# Patient Record
Sex: MALE | Employment: PART TIME | URBAN - METROPOLITAN AREA
[De-identification: names, ages, dates, MRNs, and addresses within clinical notes are randomized per-mention and may not be internally consistent; named-entity substitution may affect disease eponyms.]

---

## 2018-05-23 ENCOUNTER — HOSPITAL ENCOUNTER (OUTPATIENT)
Dept: NEUROLOGY | Age: 23
Discharge: HOME | End: 2018-05-23
Attending: FAMILY MEDICINE | Admitting: FAMILY MEDICINE

## 2018-05-23 DIAGNOSIS — R00.2 PALPITATIONS: ICD-10-CM

## 2018-05-24 LAB
ACQUISITION DURATION: NORMAL S
AVERAGE HEART RATE: 77 BPM
EKG DIAGNOSIS: NORMAL
HOLTER MAX HEART RATE: 125 BPM
HOOKUP DATE: NORMAL
HOOKUP TIME: NORMAL
Lab: NORMAL
MAX HEART RATE TIME/DATE: NORMAL
MIN HEART RATE TIME/DATE: NORMAL
MIN HEART RATE: 51 BPM
NUMBER OF QRS COMPLEXES: NORMAL
NUMBER OF SUPRAVENTRICULAR BEATS IN RUNS: 0
NUMBER OF SUPRAVENTRICULAR COUPLETS: 0
NUMBER OF SUPRAVENTRICULAR ECTOPICS: 5
NUMBER OF SUPRAVENTRICULAR ISOLATED BEATS: 5
NUMBER OF SUPRAVENTRICULAR RUNS: 0
NUMBER OF VENTRICULAR BEATS IN RUNS: 0
NUMBER OF VENTRICULAR BIGEMINAL CYCLES: 0
NUMBER OF VENTRICULAR COUPLETS: 0
NUMBER OF VENTRICULAR ECTOPICS: 2
NUMBER OF VENTRICULAR ISOLATED BEATS: 2
NUMBER OF VENTRICULAR RUNS: 0

## 2020-12-26 ENCOUNTER — HOSPITAL ENCOUNTER (EMERGENCY)
Age: 25
Discharge: PSYCHIATRIC OR SUBSTANCE USE FACILITY | End: 2020-12-27
Attending: EMERGENCY MEDICINE
Payer: COMMERCIAL

## 2020-12-26 ENCOUNTER — APPOINTMENT (OUTPATIENT)
Dept: GENERAL RADIOLOGY | Age: 25
End: 2020-12-26
Payer: COMMERCIAL

## 2020-12-26 LAB
A/G RATIO: 1.2 (ref 1.1–2.2)
ACETAMINOPHEN LEVEL: <5 UG/ML (ref 10–30)
ALBUMIN SERPL-MCNC: 5.2 G/DL (ref 3.4–5)
ALP BLD-CCNC: 106 U/L (ref 40–129)
ALT SERPL-CCNC: 17 U/L (ref 10–40)
AMPHETAMINE SCREEN, URINE: POSITIVE
ANION GAP SERPL CALCULATED.3IONS-SCNC: 14 MMOL/L (ref 3–16)
AST SERPL-CCNC: 24 U/L (ref 15–37)
BARBITURATE SCREEN URINE: ABNORMAL
BENZODIAZEPINE SCREEN, URINE: ABNORMAL
BILIRUB SERPL-MCNC: 0.9 MG/DL (ref 0–1)
BILIRUBIN DIRECT: <0.2 MG/DL (ref 0–0.3)
BILIRUBIN URINE: NEGATIVE
BILIRUBIN, INDIRECT: NORMAL MG/DL (ref 0–1)
BLOOD, URINE: NEGATIVE
BUN BLDV-MCNC: 8 MG/DL (ref 7–20)
CALCIUM SERPL-MCNC: 10.2 MG/DL (ref 8.3–10.6)
CANNABINOID SCREEN URINE: POSITIVE
CHLORIDE BLD-SCNC: 100 MMOL/L (ref 99–110)
CLARITY: CLEAR
CO2: 24 MMOL/L (ref 21–32)
COCAINE METABOLITE SCREEN URINE: ABNORMAL
COLOR: YELLOW
CREAT SERPL-MCNC: 0.8 MG/DL (ref 0.9–1.3)
ETHANOL: NORMAL MG/DL (ref 0–0.08)
GFR AFRICAN AMERICAN: >60
GFR NON-AFRICAN AMERICAN: >60
GLOBULIN: 4.2 G/DL
GLUCOSE BLD-MCNC: 97 MG/DL (ref 70–99)
GLUCOSE URINE: NEGATIVE MG/DL
INR BLD: 1.08 (ref 0.86–1.14)
KETONES, URINE: 40 MG/DL
LEUKOCYTE ESTERASE, URINE: NEGATIVE
LIPASE: 17 U/L (ref 13–60)
Lab: ABNORMAL
METHADONE SCREEN, URINE: ABNORMAL
MICROSCOPIC EXAMINATION: ABNORMAL
NITRITE, URINE: NEGATIVE
OPIATE SCREEN URINE: ABNORMAL
OXYCODONE URINE: ABNORMAL
PH UA: 7
PH UA: 7 (ref 5–8)
PHENCYCLIDINE SCREEN URINE: ABNORMAL
POTASSIUM REFLEX MAGNESIUM: 4.3 MMOL/L (ref 3.5–5.1)
PROPOXYPHENE SCREEN: ABNORMAL
PROTEIN UA: NEGATIVE MG/DL
PROTHROMBIN TIME: 12.5 SEC (ref 10–13.2)
SALICYLATE, SERUM: <0.3 MG/DL (ref 15–30)
SARS-COV-2, NAAT: NOT DETECTED
SODIUM BLD-SCNC: 138 MMOL/L (ref 136–145)
SPECIFIC GRAVITY UA: 1.02 (ref 1–1.03)
TOTAL PROTEIN: 9.4 G/DL (ref 6.4–8.2)
URINE REFLEX TO CULTURE: ABNORMAL
URINE TYPE: ABNORMAL
UROBILINOGEN, URINE: 0.2 E.U./DL

## 2020-12-26 PROCEDURE — 85025 COMPLETE CBC W/AUTO DIFF WBC: CPT

## 2020-12-26 PROCEDURE — 83690 ASSAY OF LIPASE: CPT

## 2020-12-26 PROCEDURE — G0480 DRUG TEST DEF 1-7 CLASSES: HCPCS

## 2020-12-26 PROCEDURE — U0002 COVID-19 LAB TEST NON-CDC: HCPCS

## 2020-12-26 PROCEDURE — 80307 DRUG TEST PRSMV CHEM ANLYZR: CPT

## 2020-12-26 PROCEDURE — 36415 COLL VENOUS BLD VENIPUNCTURE: CPT

## 2020-12-26 PROCEDURE — 71045 X-RAY EXAM CHEST 1 VIEW: CPT

## 2020-12-26 PROCEDURE — 99284 EMERGENCY DEPT VISIT MOD MDM: CPT

## 2020-12-26 PROCEDURE — 81003 URINALYSIS AUTO W/O SCOPE: CPT

## 2020-12-26 PROCEDURE — 80053 COMPREHEN METABOLIC PANEL: CPT

## 2020-12-26 PROCEDURE — 93005 ELECTROCARDIOGRAM TRACING: CPT | Performed by: PHYSICIAN ASSISTANT

## 2020-12-26 PROCEDURE — 85610 PROTHROMBIN TIME: CPT

## 2020-12-26 RX ORDER — FLUOXETINE 10 MG/1
10 CAPSULE ORAL DAILY
Status: ON HOLD | COMMUNITY
End: 2020-12-27

## 2020-12-26 RX ORDER — ZOLPIDEM TARTRATE 10 MG/1
TABLET ORAL NIGHTLY PRN
Status: ON HOLD | COMMUNITY
End: 2020-12-28 | Stop reason: HOSPADM

## 2020-12-26 ASSESSMENT — ENCOUNTER SYMPTOMS
ABDOMINAL PAIN: 0
CHEST TIGHTNESS: 0
NAUSEA: 0
VOMITING: 0
DIARRHEA: 0
SHORTNESS OF BREATH: 0

## 2020-12-26 NOTE — ED NOTES
Bed: 02  Expected date:   Expected time:   Means of arrival:   Comments:  Triage/emergency     Chapo Borja RN  12/26/20 9052

## 2020-12-26 NOTE — ED PROVIDER NOTES
He denies homicidal intent. He denies hallucinations. He denies that he is experiencing any physical pain including that of chest pain palpitations lightheadedness or shortness of breath. Has no GI or  complaints. It is with this he presents to the ED self-directed by his psychiatrist for evaluation and treatment. Nursing Notes were all reviewed and agreed with or any disagreements were addressed in the HPI. REVIEW OF SYSTEMS    (2-9 systems for level 4, 10 or more for level 5)     Review of Systems   Constitutional: Negative for activity change, chills and fever. Respiratory: Negative for chest tightness and shortness of breath. Cardiovascular: Negative for chest pain. Gastrointestinal: Negative for abdominal pain, diarrhea, nausea and vomiting. Genitourinary: Negative for dysuria and flank pain. Skin: Negative for rash and wound. Psychiatric/Behavioral: Positive for decreased concentration, dysphoric mood, self-injury and suicidal ideas. Positives and Pertinent negatives as per HPI. Except as noted above in the ROS, all other systems were reviewed and negative. PAST MEDICAL HISTORY   History reviewed. No pertinent past medical history. SURGICAL HISTORY   History reviewed. No pertinent surgical history. CURRENTMEDICATIONS       Previous Medications    FLUOXETINE (PROZAC) 10 MG CAPSULE    Take 10 mg by mouth daily    ZOLPIDEM (AMBIEN) 10 MG TABLET    Take by mouth nightly as needed for Sleep. ALLERGIES     Lamictal [lamotrigine]    FAMILYHISTORY     History reviewed. No pertinent family history.        SOCIAL HISTORY       Social History     Tobacco Use    Smoking status: Never Smoker    Smokeless tobacco: Never Used   Substance Use Topics    Alcohol use: Not Currently    Drug use: Never       SCREENINGS             PHYSICAL EXAM    (up to 7 for level 4, 8 or more for level 5)     ED Triage Vitals [12/26/20 1819]   BP Temp Temp src Pulse Resp SpO2 Height normal. He does not perceive auditory or visual hallucinations. Mood and Affect: Mood is depressed. Affect is flat. Speech: Speech normal.         Behavior: Behavior normal. Behavior is cooperative. Thought Content: Thought content is not paranoid or delusional. Thought content includes suicidal ideation. Thought content does not include homicidal ideation. Thought content includes suicidal plan. Thought content does not include homicidal plan. Cognition and Memory: He exhibits impaired recent memory. Judgment: Judgment is impulsive. Comments: Well-kept. Good eye contact. Flat affect. Does not appear remorseful. Reports intent for self-harm.          DIAGNOSTIC RESULTS   LABS:    Labs Reviewed   CBC WITH AUTO DIFFERENTIAL - Abnormal; Notable for the following components:       Result Value    RBC 7.51 (*)     MCH 21.0 (*)     RDW 16.4 (*)     All other components within normal limits    Narrative:     Performed at:  [facility] [address], [address]   Phone [phone]   COMPREHENSIVE METABOLIC PANEL W/ REFLEX TO MG FOR LOW K - Abnormal; Notable for the following components:    CREATININE 0.8 (*)     Total Protein 9.4 (*)     Alb 5.2 (*)     All other components within normal limits    Narrative:     Performed at:  [facility]  [address], [address]   Phone [phone]   URINE RT REFLEX TO CULTURE - Abnormal; Notable for the following components:    Ketones, Urine 40 (*)     All other components within normal limits    Narrative:     Performed at:  [facility]  [address], [address]   Phone [phone]   ACETAMINOPHEN LEVEL - Abnormal; Notable for the following components:    Acetaminophen Level <5 (*)     All other components within normal limits    Narrative:     Performed at:  [facility]  [address]  [place], [address]   Phone [phone]   SALICYLATE LEVEL - Abnormal; Notable for the following components:    Salicylate, Serum <7.5 (*)     All other components within normal limits    Narrative:     Performed at:  [facility]  [address] [employer]  [place], [address]   Phone [phone]   HEPATIC FUNCTION PANEL    Narrative:     Performed at:  [facility] [address] [employer], [address]   Phone [phone]   LIPASE    Narrative:     Performed at:  [facility] [address], [address]   Phone [phone]   PROTIME-INR    Narrative:     Performed at:  [facility] [address], [address]   Phone [phone]   ETHANOL    Narrative:     Performed at:  [facility] [address] [employer], [address]   Phone [phone]   URINE DRUG SCREEN    Narrative:     Performed at:  [facility] [address] [employer], [address]   Phone [phone]       All other labs were within normal range or not returned as of this dictation. EKG: All EKG's are interpreted by the Emergency Department Physician in the absence of a cardiologist.  Please see their note for interpretation of EKG.       RADIOLOGY:   Non-plain film images such as CT, Ultrasound and MRI are read by the radiologist. Plain radiographic images are visualized and preliminarily interpreted by the ED Provider with the below findings:      Interpretation per the Radiologist below, if available at the time of this note:    XR CHEST PORTABLE    (Results Pending)         PROCEDURES   Unless otherwise noted below, none     Procedures    CRITICAL CARE TIME   N/A    CONSULTS:  None      EMERGENCY DEPARTMENT COURSE and DIFFERENTIAL DIAGNOSIS/MDM:   Vitals:    Vitals:    12/26/20 1838 12/26/20 1843 12/26/20 1900 12/26/20 1908   BP: (!) 132/92  139/82 (!) 139/52   Pulse: 107 96 93 95   Resp: 18  16 13   Temp:       SpO2: 100%  100% 100%   Weight:       Height:           Patient was given the following medications:  Medications - No data to display        The patient's detailed history of present illness is documented as above. Upon arrival to the emergency department the patient's vital signs are as documented. The patient is noted to be hemodynamically stable and afebrile. Physical examination findings are as above. Obtained. Laboratory testing and work-up was initiated. I did speak directly with Brielle Escudero at [address] [place] [address],[address] [place] [address] in regards the above-mentioned. She states that the patient from Ambien and alcohol would have a maximum capacity of likely 4 to 6 hours and therefore the patient has no outside that timeframe since he took his medication at 4 AM last evening. Baseline laboratory testing and medical clearance has been recommended. 72-hour hold has been placed on the patient. Initial EKG demonstrates a sinus rhythm with a rate of 91. No evidence of acute ST elevation. Incomplete right bundle branch block noted. Please see attending physician details for further EKG interpretation in comparison. CBC demonstrates no evidence of leukocytosis anemia and/or thrombocytopenia. BUN is a creatinine is 0.8 nonfasting glucose 97 electrolytes and LFTs without significant aberration. Biliruben is normal.  Lipase is 17.  Ethanol is negative. Salicylate Tylenol levels are within normal limits. Urinalysis is pending at the time of this dictation. Rapid Covid is currently in process. Despite the urinalysis results even if it is positive on his toxicology screening he will require ongoing care management on an inpatient basis. As it is the end of my shift, my attending physician will follow up on the outstanding test results and assume the final disposition of this patient.   Please see attending physician note for further medical decision making, consultations, and final disposition of this patient. FINAL IMPRESSION      1. Drug overdose, intentional self-harm, initial encounter ([facility] [address]    2.  Depression with suicidal ideation          DISPOSITION/PLAN   DISPOSITION Decision To Transfer 12/26/2020 07:34:19 PM         (Please note that portions of this note were completed with a voice recognition program.  Efforts were made to edit the dictations but occasionally words are mis-transcribed.)    Tiffany Abel PA-C (electronically signed)           Daphne Ordaz PA-C  12/26/20 1939

## 2020-12-26 NOTE — ED PROVIDER NOTES
I independently performed a history and physical on Calleen Deon. All diagnostic, treatment, and disposition decisions were made by myself in conjunction with the advanced practice provider. I have participated in the medical decision making and directed the treatment plan and disposition of the patient. For further details of [facility] emergency department encounter, please see the advanced practice provider's documentation. CHIEF COMPLAINT  Chief Complaint   Patient presents with    Suicide Attempt     Pt presents to the ED with attempt suicide, Pt believes he took about 21 PILLS of Ambien around 4AM . Pt attempted to take life in 2019. Pt see therpist regularly, Pt states he felt the impulse to harm self because he was feeling depressed. Pt states that he took them with alcohol but not sure what happened after this     Briefly, Sonia Chavarria is a 23 y.o. male  who presents to the ED complaining of having trouble sleeping last night, took his normal Ambien initially but couldn't fall asleep. He says he then impulsively in an attempt to kill himself took 20x tablets 10mg tablets of the Ambien at approximately 0300. No prior suicidal attempts. He says he then blacked out or fell asleep afterwards, unsure if he vomited or not. He called his psychiatrist who is the prescriber who advised he come the ED. He says he is not actively suicidal anymore but is vague and unclear what triggered him to do this last night. He had only one or two alcohol drinks last night. He does not feel fatigued currently. Denies drug use. Regular medicines also include olanzapine and prozac. This is due to bipolar.     FOCUSED PHYSICAL EXAMINATION  BP (!) 139/52   Pulse 95   Temp 98.6 °F (37 °C)   Resp 13   Ht 5' 11\" (1.803 m)   Wt 173 lb (78.5 kg)   SpO2 100%   BMI 24.13 kg/m²    Focused physical examination notable for no acute distress, well-appearing, well-nourished, normal speech and mentation without obvious facial droop, no obvious rash. No obvious cranial nerve deficits on my initial exam. Calm, cooperative, no active SI, RRR CTAB, abd soft NTND, not hallucinating. Fair insight, poor judgment. The 12 lead EKG was interpreted by me as follows:  Rate: normal with a rate of 91  Rhythm: sinus with sinus arrhythmia  Axis: normal  Intervals: normal IL, narrow QRS, normal QTc, incomplete RBBB pattern  ST segments: no ST elevations or depressions  T waves: no abnormal inversions  Non-specific T wave changes: not present  Prior EKG comparison: No prior is currently available for comparison    MDM:  ED course was notable for intentional medication overdose. Although he is not actively suicidal I am still concerned given his impulsive behavior with bipolar history that he is high risk for further self-harm without inpatient psychiatric assessment. He is medically cleared as his work-up in the ED is benign and poison control was consulted and also stated that the patient was medically cleared. He is not exhibit lethargy which would be the primary effect of an Ambien overdose. Drug screen does demonstrate positive amphetamines but he does not exhibit this toxidrome and this may be a false positive. It does show positive cannabis which she did not initially. Rapid Covid negative. CXR clear. Filled out an involuntary psychiatric hold form on the patient and informed him of this, he has remained calm and cooperative, plan to transfer for psychiatric treatment and evaluation. Patient is awaiting an accepting facility at the end of my shift and will be signed out to overnight physician Dr. Molinda Nissen for ongoing monitoring until facility is found to accept. CLINICAL IMPRESSION  1. Drug overdose, intentional self-harm, initial encounter ([facility] [address]    2. Depression with suicidal ideation        DISPOSITION  Vera Fossa was transferred in fair condition.     This chart was created using Dragon dictation software. Efforts were made by me to ensure accuracy, however some errors may be present due to limitations of this technology.             Paul Roger MD  12/26/20 2005       Paul Roger MD  12/26/20 7249

## 2020-12-26 NOTE — ED NOTES
Pt resting quietly, no s/s of distress noted. Suicide precaution in place.  at bedside due to suicidal ideation. Pt in gown. All belongings to be collected by security.       Christi Mejia RN  32/15/32 5958

## 2020-12-27 ENCOUNTER — HOSPITAL ENCOUNTER (INPATIENT)
Age: 25
LOS: 1 days | Discharge: HOME | DRG: 885 | End: 2020-12-28
Attending: PSYCHIATRY & NEUROLOGY | Admitting: PSYCHIATRY & NEUROLOGY
Payer: COMMERCIAL

## 2020-12-27 VITALS
DIASTOLIC BLOOD PRESSURE: 68 MMHG | OXYGEN SATURATION: 98 % | HEIGHT: 71 IN | RESPIRATION RATE: 15 BRPM | WEIGHT: 173 LBS | BODY MASS INDEX: 24.22 KG/M2 | HEART RATE: 60 BPM | TEMPERATURE: 98.6 F | SYSTOLIC BLOOD PRESSURE: 112 MMHG

## 2020-12-27 PROBLEM — F32.9 MAJOR DEPRESSION, SINGLE EPISODE: Status: ACTIVE | Noted: 2020-12-27

## 2020-12-27 PROBLEM — F31.32 BIPOLAR AFFECTIVE DISORDER, DEPRESSED, MODERATE DEGREE (HCC): Status: ACTIVE | Noted: 2020-12-27

## 2020-12-27 LAB
BASOPHILS ABSOLUTE: 0.1 K/UL (ref 0–0.2)
BASOPHILS RELATIVE PERCENT: 0.7 %
EKG ATRIAL RATE: 91 BPM
EKG DIAGNOSIS: NORMAL
EKG P AXIS: 67 DEGREES
EKG P-R INTERVAL: 146 MS
EKG Q-T INTERVAL: 368 MS
EKG QRS DURATION: 102 MS
EKG QTC CALCULATION (BAZETT): 452 MS
EKG R AXIS: 64 DEGREES
EKG T AXIS: 37 DEGREES
EKG VENTRICULAR RATE: 91 BPM
EOSINOPHILS ABSOLUTE: 0.2 K/UL (ref 0–0.6)
EOSINOPHILS RELATIVE PERCENT: 1.6 %
HCT VFR BLD CALC: 49.8 % (ref 40.5–52.5)
HEMATOLOGY PATH CONSULT: YES
HEMOGLOBIN: 15.7 G/DL (ref 13.5–17.5)
LYMPHOCYTES ABSOLUTE: 1.9 K/UL (ref 1–5.1)
LYMPHOCYTES RELATIVE PERCENT: 18 %
MCH RBC QN AUTO: 21 PG (ref 26–34)
MCHC RBC AUTO-ENTMCNC: 31.6 G/DL (ref 31–36)
MCV RBC AUTO: 66.4 FL (ref 80–100)
MICROCYTES: ABNORMAL
MONOCYTES ABSOLUTE: 0.9 K/UL (ref 0–1.3)
MONOCYTES RELATIVE PERCENT: 8.3 %
NEUTROPHILS ABSOLUTE: 7.6 K/UL (ref 1.7–7.7)
NEUTROPHILS RELATIVE PERCENT: 71.4 %
OVALOCYTES: ABNORMAL
PDW BLD-RTO: 16.4 % (ref 12.4–15.4)
PLATELET # BLD: 293 K/UL (ref 135–450)
PLATELET SLIDE REVIEW: ADEQUATE
PMV BLD AUTO: 9 FL (ref 5–10.5)
RBC # BLD: 7.51 M/UL (ref 4.2–5.9)
SCHISTOCYTES: ABNORMAL
SLIDE REVIEW: ABNORMAL
TEAR DROP CELLS: ABNORMAL
WBC # BLD: 10.6 K/UL (ref 4–11)

## 2020-12-27 PROCEDURE — 93010 ELECTROCARDIOGRAM REPORT: CPT | Performed by: INTERNAL MEDICINE

## 2020-12-27 PROCEDURE — 99223 1ST HOSP IP/OBS HIGH 75: CPT | Performed by: PSYCHIATRY & NEUROLOGY

## 2020-12-27 PROCEDURE — 1240000000 HC EMOTIONAL WELLNESS R&B

## 2020-12-27 PROCEDURE — 6370000000 HC RX 637 (ALT 250 FOR IP): Performed by: PSYCHIATRY & NEUROLOGY

## 2020-12-27 RX ORDER — BENZTROPINE MESYLATE 1 MG/ML
2 INJECTION INTRAMUSCULAR; INTRAVENOUS 2 TIMES DAILY PRN
Status: DISCONTINUED | OUTPATIENT
Start: 2020-12-27 | End: 2020-12-28 | Stop reason: HOSPADM

## 2020-12-27 RX ORDER — IBUPROFEN 400 MG/1
400 TABLET ORAL EVERY 6 HOURS PRN
Status: DISCONTINUED | OUTPATIENT
Start: 2020-12-27 | End: 2020-12-28 | Stop reason: HOSPADM

## 2020-12-27 RX ORDER — MAGNESIUM HYDROXIDE/ALUMINUM HYDROXICE/SIMETHICONE 120; 1200; 1200 MG/30ML; MG/30ML; MG/30ML
30 SUSPENSION ORAL EVERY 6 HOURS PRN
Status: DISCONTINUED | OUTPATIENT
Start: 2020-12-27 | End: 2020-12-28 | Stop reason: HOSPADM

## 2020-12-27 RX ORDER — ACETAMINOPHEN 325 MG/1
650 TABLET ORAL EVERY 4 HOURS PRN
Status: DISCONTINUED | OUTPATIENT
Start: 2020-12-27 | End: 2020-12-28 | Stop reason: HOSPADM

## 2020-12-27 RX ORDER — OLANZAPINE 10 MG/1
10 TABLET ORAL
Status: ACTIVE | OUTPATIENT
Start: 2020-12-27 | End: 2020-12-27

## 2020-12-27 RX ORDER — FLUOXETINE HYDROCHLORIDE 20 MG/1
40 CAPSULE ORAL NIGHTLY
Status: DISCONTINUED | OUTPATIENT
Start: 2020-12-27 | End: 2020-12-28 | Stop reason: HOSPADM

## 2020-12-27 RX ORDER — OLANZAPINE 2.5 MG/1
2.5 TABLET ORAL NIGHTLY
COMMUNITY

## 2020-12-27 RX ORDER — OLANZAPINE 10 MG/1
10 INJECTION, POWDER, LYOPHILIZED, FOR SOLUTION INTRAMUSCULAR
Status: ACTIVE | OUTPATIENT
Start: 2020-12-27 | End: 2020-12-27

## 2020-12-27 RX ORDER — TRAZODONE HYDROCHLORIDE 50 MG/1
50 TABLET ORAL NIGHTLY PRN
Status: DISCONTINUED | OUTPATIENT
Start: 2020-12-27 | End: 2020-12-28 | Stop reason: HOSPADM

## 2020-12-27 RX ORDER — OLANZAPINE 5 MG/1
2.5 TABLET ORAL NIGHTLY
Status: DISCONTINUED | OUTPATIENT
Start: 2020-12-27 | End: 2020-12-28 | Stop reason: HOSPADM

## 2020-12-27 RX ORDER — FLUOXETINE HYDROCHLORIDE 40 MG/1
40 CAPSULE ORAL DAILY
COMMUNITY

## 2020-12-27 RX ADMIN — OLANZAPINE 2.5 MG: 5 TABLET, FILM COATED ORAL at 20:14

## 2020-12-27 RX ADMIN — FLUOXETINE 40 MG: 20 CAPSULE ORAL at 20:13

## 2020-12-27 ASSESSMENT — LIFESTYLE VARIABLES
HISTORY_ALCOHOL_USE: NO
HISTORY_ALCOHOL_USE: NO

## 2020-12-27 ASSESSMENT — SLEEP AND FATIGUE QUESTIONNAIRES
RESTFUL SLEEP: NO
DO YOU USE A SLEEP AID: YES
AVERAGE NUMBER OF SLEEP HOURS: 8
DO YOU HAVE DIFFICULTY SLEEPING: YES
DIFFICULTY ARISING: NO
SLEEP PATTERN: DIFFICULTY FALLING ASLEEP;RESTLESSNESS
RESTFUL SLEEP: YES
DIFFICULTY FALLING ASLEEP: YES
DO YOU USE A SLEEP AID: YES
DIFFICULTY FALLING ASLEEP: YES
DIFFICULTY STAYING ASLEEP: NO
DIFFICULTY STAYING ASLEEP: NO
AVERAGE NUMBER OF SLEEP HOURS: 7
DIFFICULTY ARISING: NO
DO YOU HAVE DIFFICULTY SLEEPING: YES
SLEEP PATTERN: DIFFICULTY FALLING ASLEEP

## 2020-12-27 NOTE — FLOWSHEET NOTE
12/27/20 1606   Activities of Daily Living   Patient Requires assistance with daily self-care activities?  No   Leisure Activity 1   3 Favorite Leisure Activities \"I like to play video games\"   Frequency weekly   Last time this week   Leisure Activity 2   [address]  \"watch movies with my gf\"   Frequency  weekly   Last time  this month   Leisure Activity 3   Favorite Leisure Activities  \"walk outside\"   Frequency  monthly   Last time  this month   Barriers to participating    (Weather permitting)   Social   Patient reports spending the majority of their free time with one other person  (\"With my gf\")   Patient verbalizes a preference for spending free time with one other person   Patients perception of support system more healthy   Patients perception of barriers to socializing with others include(s) no perceived barriers   Social Details Support System: \"my parents and girlfriend\"   Beliefs & Coping   Has difficulty dealing with feelings   No   Internalizes feelings/Keeps feelings in Yes   Externalizes feelings through aggressiveness or poor temper control  No   Feels uncomfortable around others  No   Has difficulty talking to others  No   Depends on others for direction or decisions Yes   Difficulty dealing with anger of others  No   Difficulty dealing with own anger  No   Difficulty managing stress No   Frequently has difficulty with relationships  No   Has recently perceived/experienced loss, disappointment, humiliation or failure  NO   General perception about self likes self   Attitude about abilities more successful than not   Locus of Control  most of the time   Belief about recovery Recovery is possible   Patient Identified Strengths  \"I am very calm and I am pretty good at analyzing situations\"   Patient Identified Limitations  \"I am struggling with school and finding and internship\"   Perception of most stressful event prior to hospitalization \"Taking too much Ambien\" Perception of changes needed \"I don't think I need to be prescribed ambien anymore. \"   Strengths and Limitations   Strengths Independent in basic self-care activities;Educated;Positive support network;Demonstrates basic social skills; Positive leisure interests   Limitations External locus of control     CTRS completed pt's AT/OT Leisure Assessment.     Sharlene Whyte, OSIRISS

## 2020-12-27 NOTE — FLOWSHEET NOTE
12/27/20 1531   Psychiatric History   Psychiatric history treatment   [facility] 2016)   Are there any medication issues? 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   Relevant self-care issues denies   Barriers to treatment No   Family Constellation   Spouse/partner-name/age GF: Frank   Children-names/ages denies   Parents Mom: jose Dad: Flaquita Daniel   Siblings older sister nimco   Childhood   Raised by Biological mother;Biological father   Biological mother Terrence Hilliard father Flaquita Daniel   Relevant family history hx of substance abuse   History of abuse No   Legal History   Legal history No   Other relevant legal issues denies   Comment denies   Juvenile legal history No    Abuse Assessment   Physical Abuse Denies   Verbal Abuse Denies   Emotional abuse Denies   Financial Abuse Denies   Sexual abuse Denies   Elder abuse No   Substance Use   Use of substances  No   Motivation for SA Treatment   Stage of engagement   (n/a)   Motivation for treatment   (n/a)   Current barriers to treatment   (n/a)   Comment n/a   Education   Education College graduate   Special education   (denies)   Work History   Currently employed Yes   Recent job loss or change   (works part time at [employer])   [address] [place] [address]   (denies)   /VA involvement denies   Leisure/Activity   Social with friends/family Yes   Cultural and Spiritual   Spiritual concerns No   Cultural concerns No   Collateral Contacts   Contacts Therapist;Psychiatrist SW met with pt to complete psychosocial and CSSR-S risk assessment. Pt told writer that his overdose on Ambien (12/25) was not intentional. He states that he may have been \"partying too hard\". He stated that his parents were out of town and he was online with friends that night. He denies any major stressors. Pt denied any past attempts and contracted for safety.

## 2020-12-27 NOTE — BH NOTE
`[facility]  Admission Note     Admission Type:   Admission Type:  Involuntary    Reason for admission:  Reason for Admission: Intentional overdose on Ambien    PATIENT STRENGTHS:  Strengths: Motivated, No significant Physical Illness, Connection to output provider, Employment, Positive Support    Patient Strengths and Limitations:       Addictive Behavior:   Addictive Behavior  In the past 3 months, have you felt or has someone told you that you have a problem with:  : None  Do you have a history of Chemical Use?: No  Do you have a history of Alcohol Use?: No  Do you have a history of Street Drug Abuse?: No  Histroy of Prescripton Drug Abuse?: No    Medical Problems:   Past Medical History:   Diagnosis Date    Bipolar 1 disorder (HCC)        Status EXAM:  Status and Exam  Normal: No  Facial Expression: Flat  Affect: Congruent, Stable  Level of Consciousness: Alert  Mood:Normal: No  Mood: Empty  Motor Activity:Normal: Yes  Interview Behavior: Evasive  Preception: [place] to Person, Shyrl Plump to Time, [place] to Place, [place] to Situation  Attention:Normal: Yes  Thought Processes: (WDL)  Thought Content:Normal: Yes  Hallucinations: None  Delusions: No  Memory:Normal: Yes  Insight and Judgment: No  Insight and Judgment: Poor Insight, Poor Judgment  Present Suicidal Ideation: No  Present Homicidal Ideation: No    Tobacco Screening:  Practical Counseling, on admission, slava X, if applicable and completed (first 3 are required if patient doesn't refuse):            ( )  Recognizing danger situations (included triggers and roadblocks)                    ( )  Coping skills (new ways to manage stress, exercise, relaxation techniques, changing routine, distraction)                                                           ( )  Basic information about quitting (benefits of quitting, techniques in how to quit, available resources ( ) [facility]                                           ( ) Patient refused counseling  ( ) Patient has not smoked in the last 30 days    Metabolic Screening:    No results found for: LABA1C    No results found for: CHOL  No results found for: TRIG  No results found for: HDL  No components found for: LDLCAL  No results found for: LABVLDL      Body mass index is 23.71 kg/m². BP Readings from Last 2 Encounters:   12/27/20 121/66   12/27/20 112/68           Pt admitted with followings belongings:  Dentures: None  Vision - Corrective Lenses: Glasses  Hearing Aid: None  Jewelry: None  Body Piercings Removed: N/A  Clothing: Pants, Shirt, Undergarments (Comment), Socks  Were All Patient Medications Collected?: Not Applicable  Other Valuables: None     Valuables were not brought into the hospital. Patient's home medications were not brought in. Patient oriented to surroundings and program expectations and copy of patient rights given. Received admission packet:  yes. Consents reviewed, signed yes. Refused ***. Patient verbalize understanding:  yes.     Patient education on precautions: yes                   Phu Walker RN

## 2020-12-27 NOTE — ED NOTES
Pt sleeping in room, sitter remains at bedside. Room remains safe.       Griselda Broussard RN  12/27/20 8222

## 2020-12-27 NOTE — ED PROVIDER NOTES
Emergency Department Encounter  Location: [address]    Patient: Annika Ratliff  MRN: [id number]  : 1995  Date of evaluation: 2020  ED Provider: Breanna Lynn MD    Annika Ratliff was checked out to me by Dr. Debora Lanza. Please see his/her initial documentation for details of the patient's initial ED presentation, physical exam and completed studies. In brief, Annika Ratliff is a 23 y.o. male who presented to the emergency department after taking 20 Ambien tablets this morning. This was a suicide attempt.     Currently studies pending:     I have reviewed and interpreted all of the currently available lab results and diagnostics from this visit:  Results for orders placed or performed during the hospital encounter of 20   CBC Auto Differential   Result Value Ref Range    WBC 10.6 4.0 - 11.0 K/uL    RBC 7.51 (H) 4.20 - 5.90 M/uL    Hemoglobin 15.7 13.5 - 17.5 g/dL    Hematocrit 49.8 40.5 - 52.5 %    MCV 66.4 (L) 80.0 - 100.0 fL    MCH 21.0 (L) 26.0 - 34.0 pg    MCHC 31.6 31.0 - 36.0 g/dL    RDW 16.4 (H) 12.4 - 15.4 %    Platelets 580 890 - 629 K/uL    MPV 9.0 5.0 - 10.5 fL    PLATELET SLIDE REVIEW Adequate     SLIDE REVIEW see below     Neutrophils % 71.4 %    Lymphocytes % 18.0 %    Monocytes % 8.3 %    Eosinophils % 1.6 %    Basophils % 0.7 %    Neutrophils Absolute 7.6 1.7 - 7.7 K/uL    Lymphocytes Absolute 1.9 1.0 - 5.1 K/uL    Monocytes Absolute 0.9 0.0 - 1.3 K/uL    Eosinophils Absolute 0.2 0.0 - 0.6 K/uL    Basophils Absolute 0.1 0.0 - 0.2 K/uL    Microcytes Occasional (A)     Schistocytes Occasional (A)     Ovalocytes Occasional (A)     Tear Drop Cells Occasional (A)    Comprehensive Metabolic Panel w/ Reflex to MG   Result Value Ref Range    Sodium 138 136 - 145 mmol/L    Potassium reflex Magnesium 4.3 3.5 - 5.1 mmol/L    Chloride 100 99 - 110 mmol/L    CO2 24 21 - 32 mmol/L    Anion Gap 14 3 - 16    Glucose 97 70 - 99 mg/dL    BUN 8 7 - 20 mg/dL    CREATININE 0.8 (L) 0.9 - 1.3 mg/dL    GFR Non-African American >60 >60    GFR African American >60 >60    Calcium 10.2 8.3 - 10.6 mg/dL    Total Protein 9.4 (H) 6.4 - 8.2 g/dL    Alb 5.2 (H) 3.4 - 5.0 g/dL    Albumin/Globulin Ratio 1.2 1.1 - 2.2    Total Bilirubin 0.9 0.0 - 1.0 mg/dL    Alkaline Phosphatase 106 40 - 129 U/L    ALT 17 10 - 40 U/L    AST 24 15 - 37 U/L    Globulin 4.2 g/dL   Hepatic Function Panel   Result Value Ref Range    Bilirubin, Direct <0.2 0.0 - 0.3 mg/dL    Bilirubin, Indirect see below 0.0 - 1.0 mg/dL   Lipase   Result Value Ref Range    Lipase 17.0 13.0 - 60.0 U/L   Protime-INR   Result Value Ref Range    Protime 12.5 10.0 - 13.2 sec    INR 1.08 0.86 - 1.14   Urinalysis Reflex to Culture    Specimen: Urine, clean catch   Result Value Ref Range    Color, UA YELLOW Straw/Yellow    Clarity, UA Clear Clear    Glucose, Ur Negative Negative mg/dL    Bilirubin Urine Negative Negative    Ketones, Urine 40 (A) Negative mg/dL    Specific Gravity, UA 1.023 1.005 - 1.030    Blood, Urine Negative Negative    pH, UA 7.0 5.0 - 8.0    Protein, UA Negative Negative mg/dL    Urobilinogen, Urine 0.2 <2.0 E.U./dL    Nitrite, Urine Negative Negative    Leukocyte Esterase, Urine Negative Negative    Microscopic Examination Not Indicated     Urine Type NotGiven     Urine Reflex to Culture Not Indicated    Ethanol   Result Value Ref Range    Ethanol Lvl None Detected mg/dL   Drug screen multi urine   Result Value Ref Range    Amphetamine Screen, Urine POSITIVE (A) Negative <1000ng/mL    Barbiturate Screen, Ur Neg Negative <200 ng/mL    Benzodiazepine Screen, Urine Neg Negative <200 ng/mL    Cannabinoid Scrn, Ur POSITIVE (A) Negative <50 ng/mL    Cocaine Metabolite Screen, Urine Neg Negative <300 ng/mL    Opiate Scrn, Ur Neg Negative <300 ng/mL    PCP Screen, Urine Neg Negative <25 ng/mL    Methadone Screen, Urine Neg Negative <300 ng/mL    Propoxyphene Scrn, Ur Neg Negative <300 ng/mL    Oxycodone Urine Neg Negative <100 ng/ml    pH, UA 7.0     Drug Screen Comment: see below    Acetaminophen level   Result Value Ref Range    Acetaminophen Level <5 (L) 10 - 30 ug/mL   Salicylate   Result Value Ref Range    Salicylate, Serum <8.0 (L) 15.0 - 30.0 mg/dL   COVID-19   Result Value Ref Range    SARS-CoV-2, NAAT Not Detected Not Detected   EKG 12 Lead   Result Value Ref Range    Ventricular Rate 91 BPM    Atrial Rate 91 BPM    P-R Interval 146 ms    QRS Duration 102 ms    Q-T Interval 368 ms    QTc Calculation (Bazett) 452 ms    P Axis 67 degrees    R Axis 64 degrees    T Axis 37 degrees    Diagnosis       Normal sinus rhythm with sinus arrhythmiaPossible Left atrial enlargementIncomplete right bundle branch blockBorderline ECG     Xr Chest Portable    Result Date: 12/26/2020  EXAMINATION: ONE XRAY VIEW OF THE CHEST 12/26/2020 7:19 pm COMPARISON: None. HISTORY: ORDERING SYSTEM PROVIDED HISTORY: SI TECHNOLOGIST PROVIDED HISTORY: Reason for exam:->SI Reason for Exam: Suicide Attempt (Pt presents to the ED with attempt suicide, Pt believes he took about 20 PILLS of Ambien around 4AM . Pt attempted to take life in 2019. Pt see therpist regularly, Pt states he felt the impulse to harm self because he was feeling depressed. Pt states that he took them with alcohol but not sure what happened after this) Acuity: Unknown Type of Exam: Unknown FINDINGS: Heart size and pulmonary vasculature within normal limits. Lungs clear. Costophrenic angles sharp     Negative       Final ED Course and MDM:  Adult male who is presently waiting admission to psychiatric facility for suicide attempt for the patient has a 72-hour hold signed in the chart. The patient is asking if he could leave because he did not give permission I explained to the patient how a 72-hour hold works and he is unable to leave. Patient changes his story and states that it was not a suicide attempt however I am unconvinced.   The patient does have a history of psychiatric conditions. I do speak to Dr. Ariana Frazier after the patient was medically cleared and he did accept this patient to his facility. Patient is reassessed and he is sleeping comfortably. Final Impression      1. Drug overdose, intentional self-harm, initial encounter ([facility] [address]    2.  Depression with suicidal ideation        DISPOSITION Decision To Transfer 12/26/2020 07:34:19 PM     (Please note that portions of this note may have been completed with a voice recognition program. Efforts were made to edit the dictations but occasionally words are mis-transcribed.)    Gama Jaimes MD  [address] [place] [address], MD  12/27/20 4830

## 2020-12-27 NOTE — H&P
Hospital Medicine History & Physical      PCP: Narayan Randall MD    Date of Admission: 12/27/2020    Date of Service: Pt seen/examined on 12/27/2020     Chief Complaint:  Suicide Attempt   Pt presents to the ED with attempt suicide, Pt believes he took about 20 PILLS of Ambien around 4AM . Pt attempted to take life in 2019. Pt see therpist regularly, Pt states he felt the impulse to harm self because he was feeling depressed. Pt states that he took them with alcohol but not sure what happened after this      History Of Present Illness: The patient is a 23 y.o. male with bipolar and thalassemia who presented to [facility]  for reports of intentional drug overdose taking 10 Ambien 20 mg tablets. Patient was seen and evaluated in the ED by the ED medical provider, patient was medically cleared for admission to [facility]. This note serves as an admission medical H&P. Tobacco use: denies  ETOH use: occasionally 2-3 drinks per week  Illicit drug use: denies        Past Medical History:        Diagnosis Date    Bipolar 1 disorder ([facility] [address]     Thalassemia        Past Surgical History:    History reviewed. No pertinent surgical history. Medications Prior to Admission:    Prior to Admission medications    Medication Sig Start Date End Date Taking? Authorizing Provider   zolpidem (AMBIEN) 10 MG tablet Take by mouth nightly as needed for Sleep. Historical Provider, MD   FLUoxetine (PROZAC) 10 MG capsule Take 10 mg by mouth daily    Historical Provider, MD       Allergies:  Lamictal [lamotrigine]    Social History:  The patient currently lives at home with parents    TOBACCO:   reports that he has never smoked. He has never used smokeless tobacco.  ETOH:   reports previous alcohol use. Family History:   Positive as follows:    History reviewed. No pertinent family history.     REVIEW OF SYSTEMS:     Constitutional: Negative for fever   HENT: Negative for sore throat - decrease hearing left Eyes: Negative for redness   Respiratory: Negative  for dyspnea, cough   Cardiovascular: Negative for chest pain   Gastrointestinal: Negative for vomiting, diarrhea   Genitourinary: Negative for hematuria   Musculoskeletal: Negative for arthralgias   Skin: Negative for rash   Neurological: Negative for syncope    Hematological: Negative for easy bruising/bleeding   Psychiatric/Behavorial: Per psychiatry team evaluation     PHYSICAL EXAM:    /66   Pulse 85   Temp 98 °F (36.7 °C) (Temporal)   Resp 16   Ht 5' 11\" (1.803 m)   Wt 170 lb (77.1 kg)   SpO2 98%   BMI 23.71 kg/m²     Gen: No distress. Alert. Eyes: No sclera icterus. No conjunctival injection. ENT: No discharge. Pharynx clear. Neck: No JVD. Trachea midline. Resp: No accessory muscle use. No crackles. No wheezes. No rhonchi. CV: Regular rate. Regular rhythm. No murmur. No rub. No edema. GI: Non-tender. Non-distended. Normal bowel sounds. Skin: Warm and dry. No nodule on exposed extremities. No rash on exposed extremities. M/S: No cyanosis. No joint deformity. No clubbing. Neuro: Awake. No focal neurologic deficit on exam.  Cranial nerves II through XII intact. Patient is able to ambulate without difficulty. Psych: Per psychiatry team evaluation     CBC:   Recent Labs     12/26/20 1836   WBC 10.6   HGB 15.7   HCT 49.8   MCV 66.4*        BMP:   Recent Labs     12/26/20  1836      K 4.3      CO2 24   BUN 8   CREATININE 0.8*     LIVER PROFILE:   Recent Labs     12/26/20  1836   AST 24   ALT 17   LIPASE 17.0   BILIDIR <0.2   BILITOT 0.9   ALKPHOS 106     PT/INR:   Recent Labs     12/26/20  1837   PROTIME 12.5   INR 1.08     APTT: No results for input(s): APTT in the last 72 hours.   UA:  Recent Labs     12/26/20 1826   COLORU YELLOW   PHUR 7.0  7.0   CLARITYU Clear   SPECGRAV 1.023   LEUKOCYTESUR Negative   UROBILINOGEN 0.2   BILIRUBINUR Negative   BLOODU Negative   GLUCOSEU Negative      U/A:    Lab Results

## 2020-12-27 NOTE — ED NOTES
3-D SIMULATION AND 3-D CONFORMAL/IMRT TREATMENT PLANNING NOTE      January 25, 2019     Patient Name:  DAVE GONZALES  YOB: 1931                          MRN:  [id number]  DX:  [ICD10] C61 Malignant neoplasm of prostate      Dave Gonzales is a 87 year old male with diagnosis of  Malignant neoplasm of prostate Transitional cell carcinoma, NOS s/p biopsy of prostate.  The patient will undergo definitive external beam RT to prostate/urethra/lower bladder and pelvis using 3-D conformal planning with curative intent. The table below states the radiation prescription, fractionation scheme and technique that will be use.     Site Technique Modality Total Dose # Fractions Fraction Dose   Boost 3D CONFORMAL 20X 2,160 cGy 12 180 cGy   Pelvis 4-FIELD 20X 4,500 cGy 25 180 cGy     Because of the following reasons, 3-D simulation and the stated treatment planning was done and this was medically necessary.  1. Close proximity of the normal structures shown below to the target volumes   2. Irregular shape of all of the normal structures and the target volumes  3. Need for DVH to review the plan and spare the normal tissue    CT simulation was done. CT images obtained and other appropriate imaging studies were brought into the treatment planning system. The targets were carefully contoured using all the available images as well as critical normal organs in the area of the treatment.    The following structures are outlined and the following dose constraints are used for treatment planning review and DVH.    % covered by the prescription dose  PTV 98% covered by the prescription dose  DMax <110% if possible  Bladder V50 <50%  Femoral Head V50 <10%, V45 <50%  Small bowel Volume over 45 Gy <90cc if possible    In addition, Institutional Guidelines were used to ensure adequate coverage of the target volume and adequate avoidance of critical structures.  The DVH and isodose curves were carefully reviewed within  Report called to Onur Vail, nurse resuming care of patient. [facility]       Carmen Carter RN  27/03/35 2649 the treatment planning system. After making sure of adequate coverage of the target volumes and dose constraints of the critical normal organs, the plan was approved.     For details of the plan, please refer to the scanned in pdf in [facility] EMR that captures the relevant details of the patient’s plan.       Authenticated by Pat Vivar M.D. on 1/28/2019 at 9:45 AM  PAT VIVAR M.D.

## 2020-12-27 NOTE — ED NOTES
Pt sitting up in bed, pt A&O x4, sitter at bedside. Pt denies needs at this time.       Paulette Mosquera RN  12/26/20 [facility] [address] [facility] [address], RN  12/26/20 1942

## 2020-12-28 VITALS
RESPIRATION RATE: 18 BRPM | OXYGEN SATURATION: 96 % | HEART RATE: 61 BPM | SYSTOLIC BLOOD PRESSURE: 110 MMHG | DIASTOLIC BLOOD PRESSURE: 61 MMHG | HEIGHT: 71 IN | WEIGHT: 170 LBS | BODY MASS INDEX: 23.8 KG/M2 | TEMPERATURE: 98.4 F

## 2020-12-28 LAB
CHOLESTEROL, TOTAL: 127 MG/DL (ref 0–199)
EKG ATRIAL RATE: 60 BPM
EKG DIAGNOSIS: NORMAL
EKG P AXIS: 69 DEGREES
EKG P-R INTERVAL: 144 MS
EKG Q-T INTERVAL: 402 MS
EKG QRS DURATION: 104 MS
EKG QTC CALCULATION (BAZETT): 402 MS
EKG R AXIS: 60 DEGREES
EKG T AXIS: 61 DEGREES
EKG VENTRICULAR RATE: 60 BPM
HDLC SERPL-MCNC: 57 MG/DL (ref 40–60)
HEMATOLOGY PATH CONSULT: NORMAL
LDL CHOLESTEROL CALCULATED: 57 MG/DL
TRIGL SERPL-MCNC: 66 MG/DL (ref 0–150)
VLDLC SERPL CALC-MCNC: 13 MG/DL

## 2020-12-28 PROCEDURE — 80061 LIPID PANEL: CPT

## 2020-12-28 PROCEDURE — 83036 HEMOGLOBIN GLYCOSYLATED A1C: CPT

## 2020-12-28 PROCEDURE — 36415 COLL VENOUS BLD VENIPUNCTURE: CPT

## 2020-12-28 PROCEDURE — 93010 ELECTROCARDIOGRAM REPORT: CPT | Performed by: INTERNAL MEDICINE

## 2020-12-28 PROCEDURE — 5130000000 HC BRIDGE APPOINTMENT

## 2020-12-28 PROCEDURE — 99239 HOSP IP/OBS DSCHRG MGMT >30: CPT | Performed by: PSYCHIATRY & NEUROLOGY

## 2020-12-28 PROCEDURE — 93005 ELECTROCARDIOGRAM TRACING: CPT | Performed by: NURSE PRACTITIONER

## 2020-12-28 NOTE — SUICIDE SAFETY PLAN
SAFETY PLAN    A suicide Safety Plan is a document that supports someone when they are having thoughts of suicide. Warning Signs that indicate a suicidal crisis may be developing: What (situations, thoughts, feelings, body sensations, behaviors, etc.) do you experience that lets you know you are beginning to think about suicide? 1. Over-use of medications  2. Feeling more reserved than usual    Internal Coping Strategies:  What things can I do (relaxation techniques, hobbies, physical activities, etc.) to take my mind off my problems without contacting another person? 1. Identify distorted thinking  2. Give my medications to my parents  3. Focus on school work/talk to my girlfriend    People and social settings that provide distraction: Who can I call or where can I go to distract me? Home/Living room, [facility], [employer]    People whom I can ask for help: Who can I call when I need help - for example, friends, family, clergy, someone else? 1. Name: Fiordaliza Kaufman                Phone: [phone]  2. Name: Kristine Hector  Phone: [phone]  3. Name: Darren Ruelas  Phone: [phone]    Professionals or [address] [place], [place] [address]vd I can contact during a crisis: Who can I call for help - for example, my doctor, my psychiatrist, my psychologist, a mental health provider, a suicide hotline? 1. Clinician Name: Dr. Tina Arthur   Phone: saved in phone    2. Clinician Name: [address] [place] [address]   Address: [address] [place] [address] [address] [place], [facility]      Phone  #: [phone]    3. Suicide Prevention Lifeline: 1-229-377-TALK (8828)    Making the environment safe: How can I make my environment (house/apartment/living space) safer? For example, can I remove guns, medications, and other items? 1. Give medication to parents  2. Stay around others  3.  Avoid Alcohol    Something that is important to me and worth living for: finishing my degree, my girlfriend

## 2020-12-28 NOTE — GROUP NOTE
Group Therapy Note    Date: 12/27/2020    Group Start Time: 7:30 PM  Group End Time: 8:00 PM  Group Topic: [address] TWILA Mosher        Group Therapy Note    Attendees: 11      Patient's Goal: Did not have goal.    Notes:  Named bowling as a coping skill. Status After Intervention:  Unchanged    Participation Level:  Active Listener and Interactive    Participation Quality: Appropriate, Attentive, Sharing and Supportive      Speech:  normal      Thought Process/Content: Logical      Affective Functioning: Flat      Mood: euthymic      Level of consciousness:  Alert, Oriented x4 and Attentive      Response to Learning: Progressing to goal      Endings: None Reported    Modes of Intervention: Support and Socialization      Discipline Responsible: /Counselor      Signature:  TWILA Hernandez

## 2020-12-28 NOTE — PRE-CERTIFICATION NOTE
[employer] ([phone]) for precert, spoke to Miguelangel Salvador @ 2083. He authorized 5 days (12/27-12/31), auth # [id number].  Review is due on or before 12/31 with Leigh Méndez at [phone] will call Daphne Orozco.

## 2020-12-28 NOTE — FLOWSHEET NOTE
12/28/20 1018   Status and Exam   Normal No   Facial Expression Sad   Affect Congruent   Level of Consciousness Alert   Mood:Normal Yes   Motor Activity:Normal Yes   Interview Behavior Cooperative   Preception [place] to Person;[place] to Time;[place] to Place;[place] to Situation   Attention:Normal Yes   Thought Processes   (concrete)   Thought Content:Normal Yes   Hallucinations None   Delusions No   Memory:Normal Yes   Insight and Judgment No   Insight and Judgment Poor Judgment   Present Suicidal Ideation No   Present Homicidal Ideation No

## 2020-12-28 NOTE — BH NOTE
[facility]  Discharge Note    Pt discharged with followings belongings:   Dentures: None  Vision - Corrective Lenses: Glasses  Hearing Aid: None  Jewelry: None  Body Piercings Removed: N/A  Clothing: Pants, Shirt, Undergarments (Comment), Socks  Were All Patient Medications Collected?: Not Applicable  Other Valuables: None   Valuables sent home with patient. Valuables retrieved from safe and returned to patient. Patient education on aftercare instructions: yes. Information faxed to [address] [place] [address] by this [address] [place] [address]. Patient verbalize understanding of AVS:  yes. Status EXAM upon discharge:  Status and Exam  Normal: No  Facial Expression: Sad  Affect: Congruent  Level of Consciousness: Alert  Mood:Normal: Yes  Mood: Other (Comment)(flat)  Motor Activity:Normal: Yes  Interview Behavior: Cooperative  Preception: [place] to Person, Terisa Quant to Time, [place] to Place, [place] to Situation  Attention:Normal: Yes  Thought Processes: (concrete)  Thought Content:Normal: Yes  Hallucinations: None  Delusions: No  Memory:Normal: Yes  Insight and Judgment: No  Insight and Judgment: Poor Judgment  Present Suicidal Ideation: No  Present Homicidal Ideation: No      Metabolic Screening:    No results found for: LABA1C    No results found for: CHOL  No results found for: TRIG  No results found for: HDL  No components found for: LDLCAL  No results found for: Farshad Vela RN    Bridge Appointment completed: Reviewed Discharge Instructions with patient. Patient verbalizes understanding and agreement with the discharge plan using the teachback method.      Referral for Outpatient Tobacco Cessation Counseling, upon discharge (slava X if applicable and completed):    ( )  Hospital staff assisted patient to call Quit Line or faxed referral                                   during hospitalization (X)  Recognizing danger situations (included triggers and roadblocks), if not completed on admission                    (X)  Coping skills (new ways to manage stress, exercise, relaxation techniques, changing routine, distraction), if not completed on admission             (X)  Basic information about quitting (benefits of quitting, techniques in how to quit, available resources, if not completed on admission  ( ) Referral for counseling faxed to [facility]   ( ) Patient refused referral  ( ) Patient refused counseling  ( ) Patient refused smoking cessation medication upon discharge    Vaccinations (slava X if applicable and completed):  ( ) Patient states already received influenza vaccine elsewhere  ( ) Patient received influenza vaccine during this hospitalization  (X) Patient refused influenza vaccine at this time

## 2020-12-28 NOTE — CARE COORDINATION
[facility]  Treatment Team Note  Review Date & Time: 12/28/2020  0900    Patient was not in treatment team      Status EXAM:   Status and Exam  Normal: No  Facial Expression: Sad  Affect: Congruent  Level of Consciousness: Alert  Mood:Normal: Yes  Mood: Other (Comment)(flat)  Motor Activity:Normal: Yes  Interview Behavior: Cooperative  Preception: [place] to Person, Sherryn Chandana to Time, [place] to Place, [place] to Situation  Attention:Normal: Yes  Thought Processes: (concrete)  Thought Content:Normal: Yes  Hallucinations: None  Delusions: No  Memory:Normal: Yes  Insight and Judgment: No  Insight and Judgment: Poor Judgment  Present Suicidal Ideation: No  Present Homicidal Ideation: No      Suicide Risk CSSR-S:  1) Within the past month, have you wished you were dead or wished you could go to sleep and not wake up? : No  2) Have you actually had any thoughts of killing yourself? : No  3) Have you been thinking about how you might kill yourself? : No  5) Have you started to work out or worked out the details of how to kill yourself?  Do you intend to carry out this plan? : No  6) Have you ever done anything, started to do anything, or prepared to do anything to end your life?: No      PLAN/TREATMENT RECOMMENDATIONS UPDATE:   Patient will take medication as prescribed, eat 75% of meals, attend groups, participate in milieu activities, participate in treatment team and care planning for discharge and follow up        Justina Dakins, RN

## 2020-12-28 NOTE — H&P
[address]                 [address] [address]                              HISTORY AND PHYSICAL    PATIENT NAME: Shayna Gonzalez                 :        1995  MED REC NO:   [id number]                          ROOM:       2306  ACCOUNT NO:   [de-identified]                           ADMIT DATE: 2020  PROVIDER:     Rudy Gandhi. Rosendo Elizondo MD    CHIEF COMPLAINT:  Overdose. HISTORY OF PRESENT ILLNESS:  The patient is a 68-year-old male who  presented to the emergency room at [facility] on 2020 after  an intentional drug ingestion. It is somewhat of an odd story, but the  patient stated that he \"accidentally\" overdosed with Ambien on  2020. He at one point had ingested up to 15 to 20, 10 mg Ambien. He states that he had had a beer that day and took an Ambien which is  what he normally does and he states that he became disinhibited. He  stated that the rest of the evening was rather hazy and he woke up at  1:00 p.m. on 2020, called work to say he was ill and then  apparently he made mention to the girlfriend that he had ingested these  Ambien tablets. She then called the psychiatrist, Dr. Semaj Wahl, as he  was upset over taking them and the psychiatrist told him that he needed  to go to the emergency room due to suicide intents. The patient states  that he is not clear if he was trying to kill himself or just took too  many tablets. His parents were in [place] at that time and are  currently there, but they are aware of his overdose as he lives with  them. He states that he has no clear precipitant for the overdose. He is  involved in outpatient treatment. He states he has trouble remembering  the exact events that occurred. He states he has overdosed in the past.  He stabilized when transferred to [facility] for admission.     PRIOR PSYCHIATRIC TREATMENT:  Inpatient, back at [facility] in 2016 for seven days with suicidal ideation. Outpatient, he goes to [facility],  sees Dr. Claudia Cash, psychiatrist and a therapist since 2012. DRUGS AND ALCOHOL:  No drugs, but occasionally uses alcohol. SOCIAL HISTORY:  Lives in [place] with his parents. 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 He also has a job currently. FAMILY PSYCHIATRIC HISTORY:  Uncle with drug abuse. CURRENT MEDICATIONS:  Zyprexa 2.5 mg at night, Prozac 40 mg daily,  Ambien 10 mg as needed for sleep. LEGAL ISSUES:  None. TRAUMA HISTORY:  He denied. ALLERGIES TO MEDICATIONS:  LAMICTAL. REVIEW OF SYSTEMS:  Pertinent positives on HPI, otherwise negative. PHYSICAL EXAMINATION:  Physical exam per _____ Juan Coles, 12/26/2020. VITAL SIGNS:  Temperature 98. 3. pulse 85, respirations 15, blood  pressure 120/66. He is 170 pounds. LABORATORY DATA:  Urine drug screen positive for cannabis and  amphetamine. It is not clear to where the amphetamine came from. Alcohol none detected. MENTAL STATUS EXAMINATION:  The patient is a 26-year-old white male who  was very pleasant and cooperative. He engaged easily. His speech was  normal rate and tone. His thoughts were logical and coherent. Insight  and judgment was impaired. He is oriented to person, place, and time. He denied any auditory or visual hallucinations. Denied any suicidal or  homicidal ideation. Fund of knowledge and language was intact. Attention and concentration was good, able to recall three objects  immediately. He said his mood was okay, but his affect was constricted. He is not sure of any abnormalities with movement. DIAGNOSES:  AXIS I:  Major depressive episode, recurrent, nonpsychotic, severe. AXIS II:  Deferred. AXIS III:  Intentional overdose, Ambien. It is unclear intent. AXIS IV:  Severe. AXIS V:  40. PLAN:  1. We will continue with Zyprexa 2.5 mg at night. 2. Prozac 40 mg daily. 3.  Discontinue Ambien. 4.  Goal for discharge would be no threat or harm to self and to develop  appropriate coping  strategies. The patient also developed appropriate  insight and judgment into his recent behavior with the overdose. 5. In for program.  6.  Follow up in outpatient through [address] 7.  Estimated length of stay two to three days. I spent approximately 70 minutes on this eval with more than 50% on  discussing the patient's care and treatment options.         Jasmyn Sutton MD    D: 12/27/2020 17:50:39       T: 12/28/2020 0:27:00     [id number]  Job#: [id number]     Doc#: [id number]    CC:

## 2020-12-28 NOTE — PLAN OF CARE
Problem: Altered Mood, Depressive Behavior:  Goal: Able to verbalize acceptance of life and situations over which he or she has no control  Outcome: Ongoing     Problem: Altered Mood, Depressive Behavior:  Goal: Able to verbalize and/or display a decrease in depressive symptoms  Outcome: Ongoing     Problem: Altered Mood, Depressive Behavior:  Goal: Ability to disclose and discuss suicidal ideas will improve  Outcome: Ongoing     Problem: Altered Mood, Depressive Behavior:  Goal: Able to verbalize support systems  Outcome: Ongoing     Problem: Altered Mood, Depressive Behavior:  Goal: Absence of self-harm  Outcome: Ongoing

## 2020-12-28 NOTE — PLAN OF CARE
EKG showed incomplete BBB this am.  Pt is asymptomatic- denies any chest pain, palpitations, dizziness, or shortness of breath. Reviewed EKG with PATTI Hollis. Ok to discharge and follow up if any symptoms mentioned above. Pt verbalized understanding.

## 2020-12-28 NOTE — DISCHARGE SUMMARY
Hospital Course Pt was admitted after od on ambien. At this time it appears was accidental due to been dissociated after taking first dose of ambien with alcohol. Pt stated and is adamant that he did not know he took that many and appears to be future oriented. Pt was cont on home regimen but did d/c ambien. He has appt with outpt psychaitrist. Patient appears to be in stable condition and close to their baseline functioning. The patient denies suicidal or homicidal ideations and is showing future orientation. Patient no longer presented an imminent risk of danger to themselves and/or others. At the time of discharge it appears that the patient has received the maximum medical benefit from this hospitalization and can be appropriately managed with community treatment. PE: (reviewed) and labs (see medical H&PE)  Labs:    Admission on 12/27/2020   Component Date Value Ref Range Status    Ventricular Rate 12/28/2020 60  BPM Preliminary    Atrial Rate 12/28/2020 60  BPM Preliminary    P-R Interval 12/28/2020 144  ms Preliminary    QRS Duration 12/28/2020 104  ms Preliminary    Q-T Interval 12/28/2020 402  ms Preliminary    QTc Calculation (Bazett) 12/28/2020 402  ms Preliminary    P Axis 12/28/2020 69  degrees Preliminary    R Axis 12/28/2020 60  degrees Preliminary    T Axis 12/28/2020 61  degrees Preliminary    Diagnosis 12/28/2020 Sinus rhythm with marked sinus arrhythmiaIncomplete right bundle branch blockBorderline ECGWhen compared with ECG of 26-DEC-2020 18:27,Vent.  rate has decreased BY  31 BPM   Preliminary   Admission on 12/26/2020, Discharged on 12/27/2020   Component Date Value Ref Range Status    WBC 12/26/2020 10.6  4.0 - 11.0 K/uL Final    RBC 12/26/2020 7.51* 4.20 - 5.90 M/uL Final    Hemoglobin 12/26/2020 15.7  13.5 - 17.5 g/dL Final    Hematocrit 12/26/2020 49.8  40.5 - 52.5 % Final    MCV 12/26/2020 66.4* 80.0 - 100.0 fL Final    MCH 12/26/2020 21.0* 26.0 - 34.0 pg Final  MCHC 12/26/2020 31.6  31.0 - 36.0 g/dL Final    RDW 12/26/2020 16.4* 12.4 - 15.4 % Final    Platelets 75/86/7530 293  135 - 450 K/uL Final    MPV 12/26/2020 9.0  5.0 - 10.5 fL Final    PLATELET SLIDE REVIEW 12/26/2020 Adequate   Final    SLIDE REVIEW 12/26/2020 see below   Final    Slide review agrees with reported results    Path Consult 12/26/2020 Yes   Final    Sent for Pathology Review    Neutrophils % 12/26/2020 71.4  % Final    Lymphocytes % 12/26/2020 18.0  % Final    Monocytes % 12/26/2020 8.3  % Final    Eosinophils % 12/26/2020 1.6  % Final    Basophils % 12/26/2020 0.7  % Final    Neutrophils Absolute 12/26/2020 7.6  1.7 - 7.7 K/uL Final    Lymphocytes Absolute 12/26/2020 1.9  1.0 - 5.1 K/uL Final    Monocytes Absolute 12/26/2020 0.9  0.0 - 1.3 K/uL Final    Eosinophils Absolute 12/26/2020 0.2  0.0 - 0.6 K/uL Final    Basophils Absolute 12/26/2020 0.1  0.0 - 0.2 K/uL Final    Microcytes 12/26/2020 Occasional*  Final    Schistocytes 12/26/2020 Occasional*  Final    Ovalocytes 12/26/2020 Occasional*  Final    Tear Drop Cells 12/26/2020 Occasional*  Final    Sodium 12/26/2020 138  136 - 145 mmol/L Final    Potassium reflex Magnesium 12/26/2020 4.3  3.5 - 5.1 mmol/L Final    Chloride 12/26/2020 100  99 - 110 mmol/L Final    CO2 12/26/2020 24  21 - 32 mmol/L Final    Anion Gap 12/26/2020 14  3 - 16 Final    Glucose 12/26/2020 97  70 - 99 mg/dL Final    BUN 12/26/2020 8  7 - 20 mg/dL Final    CREATININE 12/26/2020 0.8* 0.9 - 1.3 mg/dL Final    GFR Non- 12/26/2020 >60  >60 Final    Comment: >60 mL/min/1.73m2 EGFR, calc. for ages 25 and older using the  MDRD formula (not corrected for weight), is valid for stable  renal function.  GFR  12/26/2020 >60  >60 Final    Comment: Chronic Kidney Disease: less than 60 ml/min/1.73 sq.m. Kidney Failure: less than 15 ml/min/1.73 sq.m. Results valid for patients 18 years and older.  Calcium 12/26/2020 10.2  8.3 - 10.6 mg/dL Final    Total Protein 12/26/2020 9.4* 6.4 - 8.2 g/dL Final    Alb 12/26/2020 5.2* 3.4 - 5.0 g/dL Final    Albumin/Globulin Ratio 12/26/2020 1.2  1.1 - 2.2 Final    Total Bilirubin 12/26/2020 0.9  0.0 - 1.0 mg/dL Final    Alkaline Phosphatase 12/26/2020 106  40 - 129 U/L Final    ALT 12/26/2020 17  10 - 40 U/L Final    AST 12/26/2020 24  15 - 37 U/L Final    Comment: Specimen hemolysis has exceeded the interference as defined by Roche. Value may be falsely increased. Suggest recollection if clinically  indicated.  Globulin 12/26/2020 4.2  g/dL Final    Bilirubin, Direct 12/26/2020 <0.2  0.0 - 0.3 mg/dL Final    Comment: Specimen hemolysis has exceeded the interference as defined by Roche. Value may be falsely increased. Suggest recollection if clinically  indicated.  Bilirubin, Indirect 12/26/2020 see below  0.0 - 1.0 mg/dL Final    Comment: Indirect Bilirubin cannot be calculated since Total Bilirubin  and/or Direct Bilirubin is below measurable range.  Lipase 12/26/2020 17.0  13.0 - 60.0 U/L Final    Protime 12/26/2020 12.5  10.0 - 13.2 sec Final    Comment: Effective 11-19-19 09:00am EST  Please note reference ranges have  changed for PT and INR Testing.  INR 12/26/2020 1.08  0.86 - 1.14 Final    Comment: Effective 11/19/19 at 09:00am EST    Normal: 0.86 - 1.14  Therapeutic: 2.0 - 3.0  Pros.  Valve: 2.5 - 3.5  AMI: 2.0 - 3.0      Color, UA 12/26/2020 YELLOW  Straw/Yellow Final    Clarity, UA 12/26/2020 Clear  Clear Final    Glucose, Ur 12/26/2020 Negative  Negative mg/dL Final    Bilirubin Urine 12/26/2020 Negative  Negative Final    Ketones, Urine 12/26/2020 40* Negative mg/dL Final    Specific [place], UA 12/26/2020 1.023  1.005 - 1.030 Final    Blood, Urine 12/26/2020 Negative  Negative Final    pH, UA 12/26/2020 7.0  5.0 - 8.0 Final    Protein, UA 12/26/2020 Negative  Negative mg/dL Final  Urobilinogen, Urine 12/26/2020 0.2  <2.0 E.U./dL Final    Nitrite, Urine 12/26/2020 Negative  Negative Final    Leukocyte Esterase, Urine 12/26/2020 Negative  Negative Final    Microscopic Examination 12/26/2020 Not Indicated   Final    Urine Type 12/26/2020 NotGiven   Final    Urine received in a container without preservatives.  Urine Reflex to Culture 12/26/2020 Not Indicated   Final    Ethanol Lvl 12/26/2020 None Detected  mg/dL Final    Comment:    None Detected  Conversion factor:  100 mg/dl = .100 g/dl  For Medical Purposes Only      Amphetamine Screen, Urine 12/26/2020 POSITIVE* Negative <1000ng/mL Final    Comment: High concentrations of ephedrine/pseudoephedrine or  phenylpropanolamine may cause false positive results  for amphetamine. Therefore, confirmatory testing for  amphetamine should be considered if clinically indicated.  Barbiturate Screen, Ur 12/26/2020 Neg  Negative <200 ng/mL Final    Benzodiazepine Screen, Urine 12/26/2020 Neg  Negative <200 ng/mL Final    Cannabinoid Scrn, Ur 12/26/2020 POSITIVE* Negative <50 ng/mL Final    Cocaine Metabolite Screen, Urine 12/26/2020 Neg  Negative <300 ng/mL Final    Opiate Scrn, Ur 12/26/2020 Neg  Negative <300 ng/mL Final    Comment: \"Therapeutic levels of pain medication, especially oxycontin and synthetic  opioids, may not be detected by this Methodology. Pain management screen  panel  Drug panel-PM-Hi Res Ur, Interp (PAIN) should be considered for drug  monitoring \".  PCP Screen, Urine 12/26/2020 Neg  Negative <25 ng/mL Final    Methadone Screen, Urine 12/26/2020 Neg  Negative <300 ng/mL Final    Propoxyphene Scrn, Ur 12/26/2020 Neg  Negative <300 ng/mL Final    Oxycodone Urine 12/26/2020 Neg  Negative <100 ng/ml Final    pH, UA 12/26/2020 7.0   Final    Comment: Urine pH less than 5.0 or greater than 8.0 may indicate sample adulteration. Another sample should be collected if clinically  indicated.  Drug Screen Comment: 12/26/2020 see below   Final    Comment: This method is a screening test to detect only these drug  classes as part of a medical workup. Confirmatory testing  by another method should be ordered if clinically indicated.  Acetaminophen Level 12/26/2020 <5* 10 - 30 ug/mL Final    Comment: Therapeutic Range: 10.0-30.0 ug/mL  Toxic: >=366 ug/mL      Salicylate, Serum 92/97/1313 <0.3* 15.0 - 30.0 mg/dL Final    Comment: Therapeutic Range: 15.0-30.0 mg/dL  Toxic: >30.0 mg/dL      Ventricular Rate 12/26/2020 91  BPM Final    Atrial Rate 12/26/2020 91  BPM Final    P-R Interval 12/26/2020 146  ms Final    QRS Duration 12/26/2020 102  ms Final    Q-T Interval 12/26/2020 368  ms Final    QTc Calculation (Bazett) 12/26/2020 452  ms Final    P Axis 12/26/2020 67  degrees Final    R Axis 12/26/2020 64  degrees Final    T Axis 12/26/2020 37  degrees Final    Diagnosis 12/26/2020 Normal sinus rhythm with sinus arrhythmiaPossible Left atrial enlargementIncomplete right bundle branch blockBorderline ECGConfirmed by Ketty Lora MD, Ap Malone ([id number]) on 12/27/2020 9:33:00 AM   Final    SARS-CoV-2, NAAT 12/26/2020 Not Detected  Not Detected Final    Comment: Rapid NAAT:   Negative results should be treated as presumptive and,  if inconsistent with clinical signs and symptoms or necessary for  patient management, should be tested with an alternative molecular  assay. Negative results do not preclude SARS-CoV-2 infection and  should not be used as the sole basis for patient management decisions. This test has been authorized by the FDA under an Emergency Use  Authorization (EUA) for use by authorized laboratories.     Fact sheet for Healthcare Providers:  [email or web address]  Fact sheet for Patients: [email or web address]    METHODOLOGY: Isothermal Nucleic Acid Amplification          Mental Status Exam at Discharge:  Level of consciousness:  awake Appearance:  well-appearing, in chair, good grooming and good hygiene well-developed, well-nourished  Behavior/Motor:  no abnormalities noted normal gait and station AIMS: 0  Attitude toward examiner:  cooperative, attentive and good eye contact  Speech:  spontaneous, normal rate, normal volume and well articulated  Mood:  dysthymic  Affect:  mood congruent Anxiety: mild  Hallucinations: Absent  Thought processes:  coherent Attention span, Concentration & Attention:  attention span and concentration were age appropriate  Thought content:  Reality based no evidence of delusions OCD: none    Insight: normal insight and judgment Cognition:  oriented to person, place, and time  Fund of Knowledge: average  IQ:average Memory: intact  Suicide:  No specific plan to harm self  Sleep: sleeps through the night  Appetite: ok   Reassess Maura Risk:  no specific plan to harm self Pt has phone numbers to contact if suicidal thoughts recur and states pt will return to the hospital if suicidal feelings return.    Hospital Routine Meds:     OLANZapine  2.5 mg Oral Nightly    FLUoxetine  40 mg Oral Nightly      Hospital PRN Meds: acetaminophen, ibuprofen, magnesium hydroxide, aluminum & magnesium hydroxide-simethicone, sterile water, traZODone, benztropine mesylate   Discharge Meds:    Current Discharge Medication List           Details   OLANZapine (ZYPREXA) 2.5 MG tablet Take 2.5 mg by mouth nightly      FLUoxetine (PROZAC) 40 MG capsule Take 40 mg by mouth daily                   Disposition - Residence      Follow Up:  See Discharge Instructions

## 2020-12-29 LAB
ESTIMATED AVERAGE GLUCOSE: 108.3 MG/DL
HBA1C MFR BLD: 5.4 %